# Patient Record
Sex: MALE | Race: OTHER | HISPANIC OR LATINO | URBAN - METROPOLITAN AREA
[De-identification: names, ages, dates, MRNs, and addresses within clinical notes are randomized per-mention and may not be internally consistent; named-entity substitution may affect disease eponyms.]

---

## 2022-06-06 VITALS
RESPIRATION RATE: 16 BRPM | DIASTOLIC BLOOD PRESSURE: 103 MMHG | SYSTOLIC BLOOD PRESSURE: 213 MMHG | OXYGEN SATURATION: 98 % | WEIGHT: 154.98 LBS | HEART RATE: 73 BPM | TEMPERATURE: 98 F

## 2022-06-06 RX ORDER — CHLORHEXIDINE GLUCONATE 213 G/1000ML
1 SOLUTION TOPICAL ONCE
Refills: 0 | Status: DISCONTINUED | OUTPATIENT
Start: 2022-06-16 | End: 2022-06-30

## 2022-06-06 NOTE — H&P ADULT - HISTORY OF PRESENT ILLNESS
Cardiologist: Dr. Michelle   Pharmacy:   COVID: 6/6 or 6/7 from [address] [place] [address]    Escort:     68yo M with a FHx of CAD (sister, CABG (40s) and a PMHx moderate aortic stenosis, prostate cancer s/p radical prostatectomy, DM, HTN, HLD, PVD, BPH presented to Dr. Michelle c/o FERRER on walking 2-3 blocks with retrosternal tightness and easy fatigue with mild exertion x _____. Admits to occasional palpitations and dizziness. Denies _____ chest pain, orthopnea, leg pains/edema, LOC. Exercise Stress Echo (5/27/22): Good exercise tolerance without chest pain. 2.0mm ST depression at peak exercise. Normal LV size with mild concentric hypertrophy; LVEF 60%. Mild diastolic dysfunction with an impaired relaxation pattern. Moderate aortic stenosis. In light of pt's risk factors, abnormal Echocardiogram, and CCS Angina Class ______, pt is referred to [facility] for cardiac catheterization with possible intervention if clinically indicated.      Cardiologist: Dr. Michelle   Pharmacy: [facility] [id number] [place], [place] (meds verified with pharmacy)  COVID: 6/6 or 6/7 from [address]    Escort: Friend    66yo M with a FHx of CAD (sister, CABG (40s) and a PMHx moderate aortic stenosis, prostate cancer s/p radical prostatectomy, DM, HTN, HLD, PVD, BPH presented to Dr. Michelle c/o FERRER on walking 2-3 blocks with retrosternal tightness and easy fatigue with mild exertion. Admits to occasional palpitations and dizziness. Denies chest pain, orthopnea, leg pains/edema, LOC. Exercise Stress Echo (5/27/22): Good exercise tolerance without chest pain. 2.0mm ST depression at peak exercise. Normal LV size with mild concentric hypertrophy; LVEF 60%. Mild diastolic dysfunction with an impaired relaxation pattern. Moderate aortic stenosis. In light of pt's risk factors, abnormal Echocardiogram, and CCS Angina Class III, pt is referred to [facility] for cardiac catheterization with possible intervention if clinically indicated.      Cardiologist: Dr. Michelle   Pharmacy: [facility] [id number] [place], [place] (meds verified with pharmacy)  COVID: 6/6 or 6/7 from [address]    Escort: Friend    68yo Marshallese speaking M with a FHx of CAD (sister, CABG (40s) and a PMHx moderate aortic stenosis, prostate cancer s/p radical prostatectomy, DM, HTN, HLD, PVD, BPH presented to Dr. Michelle c/o FERRER on walking 2-3 blocks with retrosternal tightness and easy fatigue with mild exertion. Admits to occasional palpitations and dizziness. Denies chest pain, orthopnea, leg pains/edema, LOC. Exercise Stress Echo (5/27/22): Good exercise tolerance without chest pain. 2.0mm ST depression at peak exercise. Normal LV size with mild concentric hypertrophy; LVEF 60%. Mild diastolic dysfunction with an impaired relaxation pattern. Moderate aortic stenosis. In light of pt's risk factors, abnormal Echocardiogram, and CCS Angina Class III, pt is referred to [facility] for cardiac catheterization with possible intervention if clinically indicated.

## 2022-06-06 NOTE — H&P ADULT - NSHPLABSRESULTS_GEN_ALL_CORE
14.0   6.77  )-----------( 285      ( 16 Jun 2022 09:20 )             44.2       06-16    135  |  95<L>  |  13  ----------------------------<  312<H>  4.2   |  29  |  1.01    Ca    10.1      16 Jun 2022 09:47    TPro  8.8<H>  /  Alb  4.6  /  TBili  0.3  /  DBili  x   /  AST  26  /  ALT  21  /  AlkPhos  101  06-16      PT/INR - ( 16 Jun 2022 09:20 )   PT: 11.7 sec;   INR: 0.98          PTT - ( 16 Jun 2022 09:20 )  PTT:30.9 sec    CARDIAC MARKERS ( 16 Jun 2022 09:47 )  x     / x     / 289 U/L / x     / 2.8 ng/mL

## 2022-06-06 NOTE — H&P ADULT - NEUROLOGICAL
strength equal in all fields 5/5/normal/cranial nerves II-XII intact/sensation intact/responds to verbal commands

## 2022-06-06 NOTE — H&P ADULT - NSICDXPASTMEDICALHX_GEN_ALL_CORE_FT
PAST MEDICAL HISTORY:  Aortic stenosis     Diabetes     History of BPH     Hyperlipidemia     Hypertension     Prostate cancer     PVD (peripheral vascular disease)

## 2022-06-06 NOTE — H&P ADULT - NSICDXFAMILYHX_GEN_ALL_CORE_FT
Pt is asking for letter to his employer    \"my work is disputing the ED visit Aug 10th  2017  and that it was not necessary\" per Bill  \" would Sarah send a letter/ note to my company that d/t working in heat in [place] house, that I was dehyrdated.   My company is telling me it was not a work related ED visit\" per Bill   \" I needed IV's and the clinic could not help with this so I had to go to ED\" per pt    He works for [employer]   The [address] [employer]  [address]    Pt is going to call back with fax number   FAMILY HISTORY:  Father  Still living? Unknown  FH: diabetes mellitus, Age at diagnosis: Age Unknown  FH: hypertension, Age at diagnosis: Age Unknown    Mother  Still living? Unknown  FH: hypertension, Age at diagnosis: Age Unknown    Sibling  Still living? Unknown  FH: CABG (coronary artery bypass surgery), Age at diagnosis: Age Unknown

## 2022-06-06 NOTE — H&P ADULT - ASSESSMENT
66yo M with a FHx of CAD (sister, CABG (40s) and a PMHx moderate aortic stenosis, prostate cancer s/p radical prostatectomy, DM, HTN, HLD, PVD, BPH presented to Dr. Michelle c/o CARISSA on walking 2-3 blocks with retrosternal tightness and easy fatigue with mild exertion. In light of pt's risk factors, abnormal Echocardiogram, and CCS Angina Class III, pt is referred to [facility] for cardiac catheterization with possible intervention if clinically indicated.    EKG: 			  ASA __			Mallampati class: __         Anginal Class: __    -H/H = 14.0/44.2  . Pt denies BRBPR, hematuria, hematochezia, melena. Pt reports good compliance w/ home ________, stating his last dose of ____ was ____. Pt loaded w/ ASA ___mg x1 and Plavix ___mg x1  -BUN/Cr = . EF ___. Euvolemic on exam. IV NS @ ___cc/hr x __hrs started pre procedure    Sedation Plan:   Moderate  Patient Is Suitable Candidate For Sedation?     Yes    Risks & benefits of procedure and alternative therapy have been explained to the patient including but not limited to: allergic reaction, bleeding with possible need for blood transfusion, infection, renal and vascular compromise, limb damage, arrhythmia, stroke, vessel dissection/perforation, myocardial infarction, and emergent CABG. Informed consent obtained at bedside and included in chart. 68yo M with a FHx of CAD (sister, CABG (40s) and a PMHx moderate aortic stenosis, prostate cancer s/p radical prostatectomy, DM, HTN, HLD, PVD, BPH presented to Dr. Michelle c/o CARISSA on walking 2-3 blocks with retrosternal tightness and easy fatigue with mild exertion. In light of pt's risk factors, abnormal Echocardiogram, and CCS Angina Class III, pt is referred to [facility] for cardiac catheterization with possible intervention if clinically indicated.    EKG: NSR 86bpm			  ASA III			Mallampati class: III           Anginal Class: III    -H/H = 14.0/44.2  . Pt denies BRBPR, hematuria, hematochezia, melena. Pt reports good compliance w/ home ASA, stating his last dose of ASA was 6/15/22. Pt loaded w/ ASA 81mg x1 and Plavix 600mg x1  -BUN/Cr = 13/1.01. EF 60%. Euvolemic on exam. IV NS @250cc bolus followed by 75cc/2hrs started pre procedure    -Of note patient /103 when examined. Given hydralazine 10mg IV push once and administered home med amlodipine 10mg PO once. REPEAT BP:    Sedation Plan:   Moderate  Patient Is Suitable Candidate For Sedation?     Yes    Risks & benefits of procedure and alternative therapy have been explained to the patient including but not limited to: allergic reaction, bleeding with possible need for blood transfusion, infection, renal and vascular compromise, limb damage, arrhythmia, stroke, vessel dissection/perforation, myocardial infarction, and emergent CABG. Informed consent obtained at bedside and included in chart. 68yo Nepali speaking M with a FHx of CAD (sister, CABG (40s) and a PMHx moderate aortic stenosis, prostate cancer s/p radical prostatectomy, DM, HTN, HLD, PVD, BPH presented to Dr. Michelle c/o CARISSA on walking 2-3 blocks with retrosternal tightness and easy fatigue with mild exertion. In light of pt's risk factors, abnormal Echocardiogram, and CCS Angina Class III, pt is referred to [facility] for cardiac catheterization with possible intervention if clinically indicated.    EKG: NSR 86bpm, no ST/T wave inversions			  ASA III			Mallampati class: III           Anginal Class: III    -H/H = 14.0/44.2  . Pt denies BRBPR, hematuria, hematochezia, melena. Pt reports good compliance w/ home ASA, stating his last dose of ASA was 6/15/22. Pt loaded w/ ASA 81mg x1 and Plavix 600mg x1  -BUN/Cr = 13/1.01. EF 60%. Euvolemic on exam. IV NS @250cc bolus followed by 75cc/2hrs started pre procedure    -Of note patient /103 when examined. Given hydralazine 10mg IV once and administered home med amlodipine 10mg PO once. Repeat BP: 182/88    Sedation Plan:   Moderate  Patient Is Suitable Candidate For Sedation?     Yes    Risks & benefits of procedure and alternative therapy have been explained to the patient including but not limited to: allergic reaction, bleeding with possible need for blood transfusion, infection, renal and vascular compromise, limb damage, arrhythmia, stroke, vessel dissection/perforation, myocardial infarction, and emergent CABG. Informed consent obtained at bedside and included in chart.

## 2022-06-16 ENCOUNTER — OUTPATIENT (OUTPATIENT)
Dept: OUTPATIENT SERVICES | Facility: HOSPITAL | Age: 67
LOS: 1 days | Discharge: HOME | End: 2022-06-16
Payer: MEDICAID

## 2022-06-16 DIAGNOSIS — Z90.79 ACQUIRED ABSENCE OF OTHER GENITAL ORGAN(S): Chronic | ICD-10-CM

## 2022-06-16 LAB
A1C WITH ESTIMATED AVERAGE GLUCOSE RESULT: 11.3 % — HIGH (ref 4–5.6)
ALBUMIN SERPL ELPH-MCNC: 4.6 G/DL — SIGNIFICANT CHANGE UP (ref 3.3–5)
ALP SERPL-CCNC: 101 U/L — SIGNIFICANT CHANGE UP (ref 40–120)
ALT FLD-CCNC: 21 U/L — SIGNIFICANT CHANGE UP (ref 10–45)
ANION GAP SERPL CALC-SCNC: 11 MMOL/L — SIGNIFICANT CHANGE UP (ref 5–17)
ANISOCYTOSIS BLD QL: SLIGHT — SIGNIFICANT CHANGE UP
APTT BLD: 30.9 SEC — SIGNIFICANT CHANGE UP (ref 27.5–35.5)
AST SERPL-CCNC: 26 U/L — SIGNIFICANT CHANGE UP (ref 10–40)
BASOPHILS # BLD AUTO: 0 K/UL — SIGNIFICANT CHANGE UP (ref 0–0.2)
BASOPHILS NFR BLD AUTO: 0 % — SIGNIFICANT CHANGE UP (ref 0–2)
BILIRUB SERPL-MCNC: 0.3 MG/DL — SIGNIFICANT CHANGE UP (ref 0.2–1.2)
BUN SERPL-MCNC: 13 MG/DL — SIGNIFICANT CHANGE UP (ref 7–23)
CALCIUM SERPL-MCNC: 10.1 MG/DL — SIGNIFICANT CHANGE UP (ref 8.4–10.5)
CHLORIDE SERPL-SCNC: 95 MMOL/L — LOW (ref 96–108)
CHOLEST SERPL-MCNC: 226 MG/DL — HIGH
CK MB CFR SERPL CALC: 2.8 NG/ML — SIGNIFICANT CHANGE UP (ref 0–6.7)
CK SERPL-CCNC: 289 U/L — HIGH (ref 30–200)
CO2 SERPL-SCNC: 29 MMOL/L — SIGNIFICANT CHANGE UP (ref 22–31)
CREAT SERPL-MCNC: 1.01 MG/DL — SIGNIFICANT CHANGE UP (ref 0.5–1.3)
CRP SERPL-MCNC: <3 MG/L — SIGNIFICANT CHANGE UP (ref 0–4)
EGFR: 82 ML/MIN/1.73M2 — SIGNIFICANT CHANGE UP
EOSINOPHIL # BLD AUTO: 1.13 K/UL — HIGH (ref 0–0.5)
EOSINOPHIL NFR BLD AUTO: 16.7 % — HIGH (ref 0–6)
ESTIMATED AVERAGE GLUCOSE: 278 MG/DL — HIGH (ref 68–114)
GIANT PLATELETS BLD QL SMEAR: PRESENT — SIGNIFICANT CHANGE UP
GLUCOSE BLDC GLUCOMTR-MCNC: 190 MG/DL — HIGH (ref 70–99)
GLUCOSE SERPL-MCNC: 312 MG/DL — HIGH (ref 70–99)
HCT VFR BLD CALC: 44.2 % — SIGNIFICANT CHANGE UP (ref 39–50)
HDLC SERPL-MCNC: 78 MG/DL — SIGNIFICANT CHANGE UP
HGB BLD-MCNC: 14 G/DL — SIGNIFICANT CHANGE UP (ref 13–17)
INR BLD: 0.98 — SIGNIFICANT CHANGE UP (ref 0.88–1.16)
LIPID PNL WITH DIRECT LDL SERPL: 131 MG/DL — HIGH
LYMPHOCYTES # BLD AUTO: 0.89 K/UL — LOW (ref 1–3.3)
LYMPHOCYTES # BLD AUTO: 13.1 % — SIGNIFICANT CHANGE UP (ref 13–44)
MACROCYTES BLD QL: SLIGHT — SIGNIFICANT CHANGE UP
MANUAL SMEAR VERIFICATION: SIGNIFICANT CHANGE UP
MCHC RBC-ENTMCNC: 25.7 PG — LOW (ref 27–34)
MCHC RBC-ENTMCNC: 31.7 GM/DL — LOW (ref 32–36)
MCV RBC AUTO: 81.1 FL — SIGNIFICANT CHANGE UP (ref 80–100)
MICROCYTES BLD QL: SLIGHT — SIGNIFICANT CHANGE UP
MONOCYTES # BLD AUTO: 0.53 K/UL — SIGNIFICANT CHANGE UP (ref 0–0.9)
MONOCYTES NFR BLD AUTO: 7.9 % — SIGNIFICANT CHANGE UP (ref 2–14)
NEUTROPHILS # BLD AUTO: 4.16 K/UL — SIGNIFICANT CHANGE UP (ref 1.8–7.4)
NEUTROPHILS NFR BLD AUTO: 61.4 % — SIGNIFICANT CHANGE UP (ref 43–77)
NON HDL CHOLESTEROL: 148 MG/DL — HIGH
OVALOCYTES BLD QL SMEAR: SLIGHT — SIGNIFICANT CHANGE UP
PLAT MORPH BLD: ABNORMAL
PLATELET # BLD AUTO: 285 K/UL — SIGNIFICANT CHANGE UP (ref 150–400)
POTASSIUM SERPL-MCNC: 4.2 MMOL/L — SIGNIFICANT CHANGE UP (ref 3.5–5.3)
POTASSIUM SERPL-SCNC: 4.2 MMOL/L — SIGNIFICANT CHANGE UP (ref 3.5–5.3)
PROT SERPL-MCNC: 8.8 G/DL — HIGH (ref 6–8.3)
PROTHROM AB SERPL-ACNC: 11.7 SEC — SIGNIFICANT CHANGE UP (ref 10.5–13.4)
RBC # BLD: 5.45 M/UL — SIGNIFICANT CHANGE UP (ref 4.2–5.8)
RBC # FLD: 17.1 % — HIGH (ref 10.3–14.5)
RBC BLD AUTO: ABNORMAL
SMUDGE CELLS # BLD: PRESENT — SIGNIFICANT CHANGE UP
SODIUM SERPL-SCNC: 135 MMOL/L — SIGNIFICANT CHANGE UP (ref 135–145)
TRIGL SERPL-MCNC: 84 MG/DL — SIGNIFICANT CHANGE UP
VARIANT LYMPHS # BLD: 0.9 % — SIGNIFICANT CHANGE UP (ref 0–6)
WBC # BLD: 6.77 K/UL — SIGNIFICANT CHANGE UP (ref 3.8–10.5)
WBC # FLD AUTO: 6.77 K/UL — SIGNIFICANT CHANGE UP (ref 3.8–10.5)

## 2022-06-16 PROCEDURE — 93005 ELECTROCARDIOGRAM TRACING: CPT

## 2022-06-16 PROCEDURE — 99152 MOD SED SAME PHYS/QHP 5/>YRS: CPT

## 2022-06-16 PROCEDURE — 93460 R&L HRT ART/VENTRICLE ANGIO: CPT | Mod: 26

## 2022-06-16 PROCEDURE — 93010 ELECTROCARDIOGRAM REPORT: CPT

## 2022-06-16 PROCEDURE — C1887: CPT

## 2022-06-16 PROCEDURE — 80053 COMPREHEN METABOLIC PANEL: CPT

## 2022-06-16 PROCEDURE — 99205 OFFICE O/P NEW HI 60 MIN: CPT

## 2022-06-16 PROCEDURE — 85610 PROTHROMBIN TIME: CPT

## 2022-06-16 PROCEDURE — 93460 R&L HRT ART/VENTRICLE ANGIO: CPT

## 2022-06-16 PROCEDURE — 85025 COMPLETE CBC W/AUTO DIFF WBC: CPT

## 2022-06-16 PROCEDURE — 99153 MOD SED SAME PHYS/QHP EA: CPT

## 2022-06-16 PROCEDURE — 85730 THROMBOPLASTIN TIME PARTIAL: CPT

## 2022-06-16 PROCEDURE — 82962 GLUCOSE BLOOD TEST: CPT

## 2022-06-16 PROCEDURE — C1760: CPT

## 2022-06-16 PROCEDURE — 80061 LIPID PANEL: CPT

## 2022-06-16 PROCEDURE — 82550 ASSAY OF CK (CPK): CPT

## 2022-06-16 PROCEDURE — 82553 CREATINE MB FRACTION: CPT

## 2022-06-16 PROCEDURE — C1894: CPT

## 2022-06-16 PROCEDURE — 86140 C-REACTIVE PROTEIN: CPT

## 2022-06-16 PROCEDURE — C1769: CPT

## 2022-06-16 PROCEDURE — 36415 COLL VENOUS BLD VENIPUNCTURE: CPT

## 2022-06-16 PROCEDURE — 83036 HEMOGLOBIN GLYCOSYLATED A1C: CPT

## 2022-06-16 RX ORDER — INSULIN GLARGINE 100 [IU]/ML
55 INJECTION, SOLUTION SUBCUTANEOUS
Qty: 0 | Refills: 0 | DISCHARGE

## 2022-06-16 RX ORDER — CLOPIDOGREL BISULFATE 75 MG/1
600 TABLET, FILM COATED ORAL ONCE
Refills: 0 | Status: COMPLETED | OUTPATIENT
Start: 2022-06-16 | End: 2022-06-16

## 2022-06-16 RX ORDER — METFORMIN HYDROCHLORIDE 850 MG/1
1 TABLET ORAL
Qty: 0 | Refills: 0 | DISCHARGE

## 2022-06-16 RX ORDER — DEXTROSE 50 % IN WATER 50 %
25 SYRINGE (ML) INTRAVENOUS ONCE
Refills: 0 | Status: DISCONTINUED | OUTPATIENT
Start: 2022-06-16 | End: 2022-06-30

## 2022-06-16 RX ORDER — METOPROLOL TARTRATE 50 MG
1 TABLET ORAL
Qty: 0 | Refills: 0 | DISCHARGE

## 2022-06-16 RX ORDER — SODIUM CHLORIDE 9 MG/ML
250 INJECTION INTRAMUSCULAR; INTRAVENOUS; SUBCUTANEOUS ONCE
Refills: 0 | Status: COMPLETED | OUTPATIENT
Start: 2022-06-16 | End: 2022-06-16

## 2022-06-16 RX ORDER — SODIUM CHLORIDE 9 MG/ML
1000 INJECTION, SOLUTION INTRAVENOUS
Refills: 0 | Status: DISCONTINUED | OUTPATIENT
Start: 2022-06-16 | End: 2022-06-30

## 2022-06-16 RX ORDER — DEXTROSE 50 % IN WATER 50 %
15 SYRINGE (ML) INTRAVENOUS ONCE
Refills: 0 | Status: DISCONTINUED | OUTPATIENT
Start: 2022-06-16 | End: 2022-06-30

## 2022-06-16 RX ORDER — AMLODIPINE BESYLATE 2.5 MG/1
1 TABLET ORAL
Qty: 0 | Refills: 0 | DISCHARGE

## 2022-06-16 RX ORDER — LISINOPRIL 2.5 MG/1
1 TABLET ORAL
Qty: 0 | Refills: 0 | DISCHARGE

## 2022-06-16 RX ORDER — ATORVASTATIN CALCIUM 80 MG/1
1 TABLET, FILM COATED ORAL
Qty: 0 | Refills: 0 | DISCHARGE

## 2022-06-16 RX ORDER — ASPIRIN/CALCIUM CARB/MAGNESIUM 324 MG
81 TABLET ORAL ONCE
Refills: 0 | Status: COMPLETED | OUTPATIENT
Start: 2022-06-16 | End: 2022-06-16

## 2022-06-16 RX ORDER — DEXTROSE 50 % IN WATER 50 %
12.5 SYRINGE (ML) INTRAVENOUS ONCE
Refills: 0 | Status: DISCONTINUED | OUTPATIENT
Start: 2022-06-16 | End: 2022-06-30

## 2022-06-16 RX ORDER — ASPIRIN/CALCIUM CARB/MAGNESIUM 324 MG
1 TABLET ORAL
Qty: 0 | Refills: 0 | DISCHARGE

## 2022-06-16 RX ORDER — INSULIN LISPRO 100/ML
VIAL (ML) SUBCUTANEOUS ONCE
Refills: 0 | Status: COMPLETED | OUTPATIENT
Start: 2022-06-16 | End: 2022-06-16

## 2022-06-16 RX ORDER — SODIUM CHLORIDE 9 MG/ML
500 INJECTION INTRAMUSCULAR; INTRAVENOUS; SUBCUTANEOUS
Refills: 0 | Status: DISCONTINUED | OUTPATIENT
Start: 2022-06-16 | End: 2022-06-16

## 2022-06-16 RX ORDER — GLUCAGON INJECTION, SOLUTION 0.5 MG/.1ML
1 INJECTION, SOLUTION SUBCUTANEOUS ONCE
Refills: 0 | Status: DISCONTINUED | OUTPATIENT
Start: 2022-06-16 | End: 2022-06-30

## 2022-06-16 RX ORDER — AMLODIPINE BESYLATE 2.5 MG/1
10 TABLET ORAL ONCE
Refills: 0 | Status: COMPLETED | OUTPATIENT
Start: 2022-06-16 | End: 2022-06-16

## 2022-06-16 RX ORDER — HYDRALAZINE HCL 50 MG
10 TABLET ORAL ONCE
Refills: 0 | Status: COMPLETED | OUTPATIENT
Start: 2022-06-16 | End: 2022-06-16

## 2022-06-16 RX ORDER — INSULIN DETEMIR 100/ML (3)
0 INSULIN PEN (ML) SUBCUTANEOUS
Qty: 0 | Refills: 0 | DISCHARGE

## 2022-06-16 RX ADMIN — CLOPIDOGREL BISULFATE 600 MILLIGRAM(S): 75 TABLET, FILM COATED ORAL at 11:01

## 2022-06-16 RX ADMIN — AMLODIPINE BESYLATE 10 MILLIGRAM(S): 2.5 TABLET ORAL at 10:37

## 2022-06-16 RX ADMIN — Medication 8: at 10:58

## 2022-06-16 RX ADMIN — Medication 10 MILLIGRAM(S): at 10:01

## 2022-06-16 RX ADMIN — SODIUM CHLORIDE 75 MILLILITER(S): 9 INJECTION INTRAMUSCULAR; INTRAVENOUS; SUBCUTANEOUS at 10:42

## 2022-06-16 RX ADMIN — Medication 81 MILLIGRAM(S): at 11:01

## 2022-06-16 RX ADMIN — SODIUM CHLORIDE 1000 MILLILITER(S): 9 INJECTION INTRAMUSCULAR; INTRAVENOUS; SUBCUTANEOUS at 10:41

## 2022-06-16 NOTE — CONSULT NOTE ADULT - NS ATTEND AMEND GEN_ALL_CORE FT
Patient seen and examined with PA/fellow bedside and discussed during rounds.  PA/fellow note read, including vitals, physical findings, laboratory data, and radiological reports.   Revisions included below. Direct personal management at bedside and extensive interpretation of the data performed.  Plan was outlined and discussed in details with the multidisciplinary team.  Decision making of high complexity.     66y/o Syriac speaking male with a FHx of CAD (sister, CABG (40s) and a PMHx moderate aortic stenosis, prostate cancer s/p radical prostatectomy, DM, HTN, HLD, PVD, BPH presented with FERRER on walking 2-3 blocks with retrosternal tightness and easy fatigue with mild exertion. Admits to occasional palpitations and dizziness. Denies chest pain, orthopnea, leg pains/edema, LOC. Exercise Stress Echo (5/27/22) showed 2.0mm ST depression at peak exercise, normal LV size with mild concentric hypertrophy, LVEF 60%, moderate aortic stenosis. Patient undwent cardiac cath showing LV/Ao peak-peak gradient 40mmHg on pullback, nonobstructive CAD. Patient is low risk for isolated SAVR.   -obtain TTE performed by Dr. Michelle  -plan for TAVR testing, CT surgical evaluation  -F/u in [facility] office as outpt    I was physically present for the key portions of the evaluation and management (E/M) service provided.  I agree with the above history, physical, and plan which I have reviewed and edited where appropriate.     80 minutes spent on total encounter; more than 50% of the visit was spent counseling and/or coordinating care by the attending physician.

## 2022-06-16 NOTE — PROGRESS NOTE ADULT - SUBJECTIVE AND OBJECTIVE BOX
Interventional Cardiology PA [facility] Discharge Note    s/p R/C cath 6/16: non obstructive coronaries, RHC ladi, severe AS SALAS 0.9 cm2 and P-P gradient 39mmHg  RRA and RFV access ( [facility]).  No IVF post as confirmed by interventional fellow    Patient without complaints. Ambulated and voided without difficulties    Afebrile, VSS    Ext:    		Right  Groin:  no hmatoma, no bruit, dressing; C/D/I  		Right Radial : no hematoma,  no bleeding, dressing; C/D/I      Pulses:    intact RAD 2+  R DP and PT 2+     A/P:    66yo Setswana speaking M with a FHx of CAD (sister, CABG (40s) and a PMHx moderate aortic stenosis, prostate cancer s/p radical prostatectomy, DM, HTN, HLD, PVD, BPH presented to Dr. Michelle c/o FERRER on walking 2-3 blocks with retrosternal tightness and easy fatigue with mild exertion. In light of pt's risk factors, abnormal Echocardiogram, and CCS Angina Class III, pt is referred to [facility] for cardiac catheterization. R/LHC cath today revealed non obstructive coronaries, RHC ladi, severe AS SALAS 0.9 cm2 and P-P gradient 39mmHg.  ECHO to be done today before discharge and CTS consult as outpt.                1.	Stable for discharge as per attending Dr. Michelle  after bed rest, pt voids, groin/wrist stable and 30 minutes of ambulation.  2.	Follow-up with Cardiologist _Dr Michelle  in 1-2 weeks, CTS to be consulted as outpatient as per Dr Michelle  3.	Discharged forms signed and copies in chart

## 2022-06-16 NOTE — CONSULT NOTE ADULT - ASSESSMENT
Assesment:  66y Male  68y/o Turkmen speaking male with a FHx of CAD (sister, CABG (40s) and a PMHx moderate aortic stenosis, prostate cancer s/p radical prostatectomy, DM, HTN, HLD, PVD, BPH presented to Dr. Michelle c/o FERRER on walking 2-3 blocks with retrosternal tightness and easy fatigue with mild exertion. Admits to occasional palpitations and dizziness. Denies chest pain, orthopnea, leg pains/edema, LOC. Exercise Stress Echo (5/27/22): Good exercise tolerance without chest pain. 2.0mm ST depression at peak exercise. Normal LV size with mild concentric hypertrophy; LVEF 60%. Mild diastolic dysfunction with an impaired relaxation pattern. Moderate aortic stenosis. In light of pt's risk factors, abnormal Echocardiogram, and CCS Angina Class III, pt is referred to [facility] for cardiac catheterization with possible intervention if clinically indicated. Cath today showed-->      Plan:  Problem 1: Aortic Stenosis:      Problem 2:      Problem 3:      Problem 4:    I have reviewed clinical labs tests and reports, radiology tests and reports, as well as old patient medical records, and discussed with the refering physician.     Assesment:  66y Male  66y/o Irish speaking male with a FHx of CAD (sister, CABG (40s) and a PMHx moderate aortic stenosis, prostate cancer s/p radical prostatectomy, DM, HTN, HLD, PVD, BPH presented to Dr. Michelle c/o FERRER on walking 2-3 blocks with retrosternal tightness and easy fatigue with mild exertion. Admits to occasional palpitations and dizziness. Denies chest pain, orthopnea, leg pains/edema, LOC. Exercise Stress Echo (5/27/22): Good exercise tolerance without chest pain. 2.0mm ST depression at peak exercise. Normal LV size with mild concentric hypertrophy; LVEF 60%. Mild diastolic dysfunction with an impaired relaxation pattern. Moderate aortic stenosis. In light of pt's risk factors, abnormal Echocardiogram, and CCS Angina Class III, pt is referred to [facility] for cardiac catheterization with possible intervention if clinically indicated. Cath today showed-->      Plan:  Problem 1: Aortic Stenosis: To follow up with Dr. Hoang in the office.  Gave him a card for Dr. Hoang.  He can call [phone] to schedule his follow up visit, in 2-3 weeks.  Remaining instructions per primary team.     I have reviewed clinical labs tests and reports, radiology tests and reports, as well as old patient medical records, and discussed with the refering physician.

## 2022-06-16 NOTE — CONSULT NOTE ADULT - SUBJECTIVE AND OBJECTIVE BOX
Surgeon: Arun Hoang    Requesting Physician: Dr. Michelle    HISTORY OF PRESENT ILLNESS (Need 4):  From H&P--> 66y/o [place] speaking male with a FHx of CAD (sister, CABG (40s) and a PMHx moderate aortic stenosis, prostate cancer s/p radical prostatectomy, DM, HTN, HLD, PVD, BPH presented to Dr. Michelle c/o FERRER on walking 2-3 blocks with retrosternal tightness and easy fatigue with mild exertion. Admits to occasional palpitations and dizziness. Denies chest pain, orthopnea, leg pains/edema, LOC. Exercise Stress Echo (5/27/22): Good exercise tolerance without chest pain. 2.0mm ST depression at peak exercise. Normal LV size with mild concentric hypertrophy; LVEF 60%. Mild diastolic dysfunction with an impaired relaxation pattern. Moderate aortic stenosis. In light of pt's risk factors, abnormal Echocardiogram, and CCS Angina Class III, pt is referred to [facility] for cardiac catheterization with possible intervention if clinically indicated.     Current HPI--> Patient..    PAST MEDICAL & SURGICAL HISTORY:  Aortic stenosis  Hypertension  Hyperlipidemia  Diabetes  PVD (peripheral vascular disease)  History of BPH  Prostate cancer  H/O prostatectomy  2018        MEDICATIONS  (STANDING):  chlorhexidine 4% Liquid 1 Application(s) Topical once  dextrose 5%. 1000 milliLiter(s) (100 mL/Hr) IV Continuous <Continuous>  dextrose 5%. 1000 milliLiter(s) (50 mL/Hr) IV Continuous <Continuous>  dextrose 50% Injectable 25 Gram(s) IV Push Once  dextrose 50% Injectable 12.5 Gram(s) IV Push Once  dextrose 50% Injectable 25 Gram(s) IV Push Once  glucagon  Injectable 1 milliGRAM(s) IntraMuscular Once    MEDICATIONS  (PRN):  dextrose Oral Gel 15 Gram(s) Oral Once PRN Blood Glucose LESS THAN 70 milliGRAM(s)/deciliter      Allergies    No Known Allergies    Intolerances      SOCIAL HISTORY:  Social Hx: Admits to social alcohol use. Denies tobacco use. Unknown illicit drug history.    FAMILY HISTORY:  FH: CABG (coronary artery bypass surgery) (Sibling)  Sister in 40s    FH: diabetes mellitus (Father)    FH: hypertension (Father, Mother)        Review of Systems (Need 10):  CONSTITUTIONAL: Denies fevers / chills, sweats, fatigue, weight loss, weight gain                                       NEURO:  Denies parathesias, seizures, syncope, confusion                                                                                  EYES:  Denies blurry vision, discharge, pain, loss of vision                                                                                    ENMT:  Denies difficulty hearing, vertigo, dysphagia, epistaxis, recent dental work                                       CV:  +CP. +FERRER.  Denies palpitations, orthopnea                                                                                           RESPIRATORY:  +SOB.  Denies wWheezing, cough / sputum, hemoptysis                                                               GI:  Denies nausea, vomiting, diarrhea, constipation, melena                                                                          : Denies hematuria, dysuria, urgency, incontinence                                                                                          MUSKULOSKELETAL:  Denies arthritis, joint swelling, muscle weakness                                                             SKIN/BREAST:  Denies rash, itching, hair loss, masses                                                                                              PSYCH:  Denies depression, anxiety, suicidal ideation                                                                                                HEME/LYMPH:  Denies bruises easily, enlarged lymph nodes, tender lymph nodes                                          ENDOCRINE:  Denies cold intolerance, heat intolerance, polydipsia                                                                      Vital Signs Last 24 Hrs  T(C): --  T(F): --  HR: --  BP: --  BP(mean): --  RR: --  SpO2: --    Physical Exam (Need 8)  GEN: NAD, looks comfortable  Psych: Mood appropriate  Neuro: A&Ox3.  No focal deficits.  Moving all extremities.   HEENT: No obvious abnormalities  CV: S1S2, regular, no murmurs appreciated.  No carotid bruits.  No JVD  Lungs: Clear B/L.  No wheezing, rales or rhonchi  ABD: Soft, non-tender, non-distended.  +Bowel sounds  EXT: Warm and well perfused.  No peripheral edema noted  Musculoskeletal: Moving all extremities with normal ROM, no joint swelling  PV: Pedal pulses palpable    LABS:                        14.0   6.77  )-----------( 285      ( 16 Jun 2022 09:20 )             44.2     06-16    135  |  95<L>  |  13  ----------------------------<  312<H>  4.2   |  29  |  1.01    Ca    10.1      16 Jun 2022 09:47    TPro  8.8<H>  /  Alb  4.6  /  TBili  0.3  /  DBili  x   /  AST  26  /  ALT  21  /  AlkPhos  101  06-16    PT/INR - ( 16 Jun 2022 09:20 )   PT: 11.7 sec;   INR: 0.98          PTT - ( 16 Jun 2022 09:20 )  PTT:30.9 sec    CARDIAC MARKERS ( 16 Jun 2022 09:47 )  x     / x     / 289 U/L / x     / 2.8 ng/mL      RADIOLOGY & ADDITIONAL STUDIES:    CXR:     CT Scan:     EKG:     TTE:    Cardiac Cath today: Surgeon: Arun Hoang    Requesting Physician: Dr. Michelle    HISTORY OF PRESENT ILLNESS (Need 4):  From H&P--> 68y/o Persian speaking male with a FHx of CAD (sister, CABG (40s) and a PMHx moderate aortic stenosis, prostate cancer s/p radical prostatectomy, DM, HTN, HLD, PVD, BPH presented to Dr. Michelle c/o FERRER on walking 2-3 blocks with retrosternal tightness and easy fatigue with mild exertion. Admits to occasional palpitations and dizziness. Denies chest pain, orthopnea, leg pains/edema, LOC. Exercise Stress Echo (5/27/22): Good exercise tolerance without chest pain. 2.0mm ST depression at peak exercise. Normal LV size with mild concentric hypertrophy; LVEF 60%. Mild diastolic dysfunction with an impaired relaxation pattern. Moderate aortic stenosis. In light of pt's risk factors, abnormal Echocardiogram, and CCS Angina Class III, pt is referred to [facility] for cardiac catheterization with possible intervention if clinically indicated.     Current HPI--> Via family member who is present, speaks English.  Patient feels well currently, does admit to FERRER with 2-3 blocks and associated CP and fatigue.  Here for elective cath, and found to have moderate AS (known).  Being discharged home soon, following radial band removal.  Was told he will come back in "a few months" for a procedure on his aortic valve.     PAST MEDICAL & SURGICAL HISTORY:  Aortic stenosis  Hypertension  Hyperlipidemia  Diabetes  PVD (peripheral vascular disease)  History of BPH  Prostate cancer  H/O prostatectomy  2018        MEDICATIONS  (STANDING):  chlorhexidine 4% Liquid 1 Application(s) Topical once  dextrose 5%. 1000 milliLiter(s) (100 mL/Hr) IV Continuous <Continuous>  dextrose 5%. 1000 milliLiter(s) (50 mL/Hr) IV Continuous <Continuous>  dextrose 50% Injectable 25 Gram(s) IV Push Once  dextrose 50% Injectable 12.5 Gram(s) IV Push Once  dextrose 50% Injectable 25 Gram(s) IV Push Once  glucagon  Injectable 1 milliGRAM(s) IntraMuscular Once    MEDICATIONS  (PRN):  dextrose Oral Gel 15 Gram(s) Oral Once PRN Blood Glucose LESS THAN 70 milliGRAM(s)/deciliter      Allergies    No Known Allergies    Intolerances      SOCIAL HISTORY:  Social Hx: Admits to social alcohol use. Denies tobacco use. Unknown illicit drug history.    FAMILY HISTORY:  FH: CABG (coronary artery bypass surgery) (Sibling)  Sister in 40s    FH: diabetes mellitus (Father)    FH: hypertension (Father, Mother)        Review of Systems (Need 10):  CONSTITUTIONAL: Denies fevers / chills, sweats, fatigue, weight loss, weight gain                                       NEURO:  Denies parathesias, seizures, syncope, confusion                                                                                  EYES:  Denies blurry vision, discharge, pain, loss of vision                                                                                    ENMT:  Denies difficulty hearing, vertigo, dysphagia, epistaxis, recent dental work                                       CV:  +CP. +FERRER.  Denies palpitations, orthopnea                                                                                           RESPIRATORY:  +SOB.  Denies wWheezing, cough / sputum, hemoptysis                                                               GI:  Denies nausea, vomiting, diarrhea, constipation, melena                                                                          : Denies hematuria, dysuria, urgency, incontinence                                                                                          MUSKULOSKELETAL:  Denies arthritis, joint swelling, muscle weakness                                                             SKIN/BREAST:  Denies rash, itching, hair loss, masses                                                                                              PSYCH:  Denies depression, anxiety, suicidal ideation                                                                                                HEME/LYMPH:  Denies bruises easily, enlarged lymph nodes, tender lymph nodes                                          ENDOCRINE:  Denies cold intolerance, heat intolerance, polydipsia                                                                      Vital Signs Last 24 Hrs  T(C): --  T(F): --  HR: --  BP: --  BP(mean): --  RR: --  SpO2: --    Physical Exam (Need 8)  GEN: NAD, looks comfortable  Psych: Mood appropriate  Neuro: A&Ox3.  No focal deficits.  Moving all extremities.   HEENT: No obvious abnormalities  CV: S1S2, regular +OLVIN heard throughout precordium, loudest over LSB.  No carotid bruits.  No JVD  Lungs: Clear B/L.  No wheezing, rales or rhonchi  ABD: Soft, non-tender, non-distended.  +Bowel sounds  EXT: Warm and well perfused.  No peripheral edema noted  Musculoskeletal: Moving all extremities with normal ROM, no joint swelling  PV: Pedal pulses palpable  Rt groin site, dressed w/ gauze.  No hematoma, no bleeding seen.  Right radial TR band in place.     LABS:                        14.0   6.77  )-----------( 285      ( 16 Jun 2022 09:20 )             44.2     06-16    135  |  95<L>  |  13  ----------------------------<  312<H>  4.2   |  29  |  1.01    Ca    10.1      16 Jun 2022 09:47    TPro  8.8<H>  /  Alb  4.6  /  TBili  0.3  /  DBili  x   /  AST  26  /  ALT  21  /  AlkPhos  101  06-16    PT/INR - ( 16 Jun 2022 09:20 )   PT: 11.7 sec;   INR: 0.98          PTT - ( 16 Jun 2022 09:20 )  PTT:30.9 sec    CARDIAC MARKERS ( 16 Jun 2022 09:47 )  x     / x     / 289 U/L / x     / 2.8 ng/mL      RADIOLOGY & ADDITIONAL STUDIES:    Cardiac Cath today: s/p R/LHC cath 6/16: non obstructive coronaries, RHC ladi, severe AS SALAS 0.9 cm2 and P-P gradient 39mmHg  RRA and RFV access ( vascade).  No IVF post as confirmed by interventional fellow

## 2022-06-21 PROBLEM — Z00.00 ENCOUNTER FOR PREVENTIVE HEALTH EXAMINATION: Status: ACTIVE | Noted: 2022-06-21

## 2022-06-21 PROBLEM — I10 ESSENTIAL (PRIMARY) HYPERTENSION: Chronic | Status: ACTIVE | Noted: 2022-06-06

## 2022-06-21 PROBLEM — Z87.438 PERSONAL HISTORY OF OTHER DISEASES OF MALE GENITAL ORGANS: Chronic | Status: ACTIVE | Noted: 2022-06-06

## 2022-06-21 PROBLEM — C61 MALIGNANT NEOPLASM OF PROSTATE: Chronic | Status: ACTIVE | Noted: 2022-06-06

## 2022-06-21 PROBLEM — E78.5 HYPERLIPIDEMIA, UNSPECIFIED: Chronic | Status: ACTIVE | Noted: 2022-06-06

## 2022-06-21 PROBLEM — E11.9 TYPE 2 DIABETES MELLITUS WITHOUT COMPLICATIONS: Chronic | Status: ACTIVE | Noted: 2022-06-06

## 2022-06-21 PROBLEM — I35.0 NONRHEUMATIC AORTIC (VALVE) STENOSIS: Chronic | Status: ACTIVE | Noted: 2022-06-06

## 2022-06-21 PROBLEM — I73.9 PERIPHERAL VASCULAR DISEASE, UNSPECIFIED: Chronic | Status: ACTIVE | Noted: 2022-06-06

## 2022-06-24 DIAGNOSIS — I25.110 ATHEROSCLEROTIC HEART DISEASE OF NATIVE CORONARY ARTERY WITH UNSTABLE ANGINA PECTORIS: ICD-10-CM

## 2022-06-24 DIAGNOSIS — R94.39 ABNORMAL RESULT OF OTHER CARDIOVASCULAR FUNCTION STUDY: ICD-10-CM

## 2022-06-24 DIAGNOSIS — I35.0 NONRHEUMATIC AORTIC (VALVE) STENOSIS: ICD-10-CM

## 2022-06-27 ENCOUNTER — APPOINTMENT (OUTPATIENT)
Dept: CARDIOTHORACIC SURGERY | Facility: CLINIC | Age: 67
End: 2022-06-27

## 2022-06-27 VITALS — DIASTOLIC BLOOD PRESSURE: 89 MMHG | SYSTOLIC BLOOD PRESSURE: 187 MMHG

## 2022-06-27 VITALS
HEART RATE: 71 BPM | HEIGHT: 67 IN | TEMPERATURE: 96.6 F | SYSTOLIC BLOOD PRESSURE: 176 MMHG | RESPIRATION RATE: 18 BRPM | BODY MASS INDEX: 23.86 KG/M2 | DIASTOLIC BLOOD PRESSURE: 87 MMHG | WEIGHT: 152 LBS | OXYGEN SATURATION: 97 %

## 2022-06-27 DIAGNOSIS — Z82.49 FAMILY HISTORY OF ISCHEMIC HEART DISEASE AND OTHER DISEASES OF THE CIRCULATORY SYSTEM: ICD-10-CM

## 2022-06-27 DIAGNOSIS — Z86.79 PERSONAL HISTORY OF OTHER DISEASES OF THE CIRCULATORY SYSTEM: ICD-10-CM

## 2022-06-27 DIAGNOSIS — Z86.39 PERSONAL HISTORY OF OTHER ENDOCRINE, NUTRITIONAL AND METABOLIC DISEASE: ICD-10-CM

## 2022-06-27 DIAGNOSIS — Z85.46 PERSONAL HISTORY OF MALIGNANT NEOPLASM OF PROSTATE: ICD-10-CM

## 2022-06-27 DIAGNOSIS — Z78.9 OTHER SPECIFIED HEALTH STATUS: ICD-10-CM

## 2022-06-27 DIAGNOSIS — Z87.438 PERSONAL HISTORY OF OTHER DISEASES OF MALE GENITAL ORGANS: ICD-10-CM

## 2022-06-27 PROCEDURE — 99213 OFFICE O/P EST LOW 20 MIN: CPT

## 2022-06-29 PROBLEM — Z87.438 HISTORY OF BPH: Status: RESOLVED | Noted: 2022-06-29 | Resolved: 2022-06-29

## 2022-06-29 PROBLEM — Z86.79 HISTORY OF HYPERTENSION: Status: RESOLVED | Noted: 2022-06-29 | Resolved: 2022-06-29

## 2022-06-29 PROBLEM — Z86.39 HISTORY OF DIABETES MELLITUS: Status: RESOLVED | Noted: 2022-06-29 | Resolved: 2022-06-29

## 2022-06-29 PROBLEM — Z86.79 HISTORY OF PERIPHERAL VASCULAR DISEASE: Status: RESOLVED | Noted: 2022-06-29 | Resolved: 2022-06-29

## 2022-06-29 PROBLEM — Z78.9 DOES NOT USE TOBACCO: Status: ACTIVE | Noted: 2022-06-29

## 2022-06-29 PROBLEM — Z82.49 FAMILY HISTORY OF CORONARY ARTERIOSCLEROSIS: Status: ACTIVE | Noted: 2022-06-29

## 2022-06-29 PROBLEM — Z85.46 HISTORY OF MALIGNANT NEOPLASM OF PROSTATE: Status: RESOLVED | Noted: 2022-06-29 | Resolved: 2022-06-29

## 2022-06-29 PROBLEM — Z86.39 HISTORY OF HYPERLIPIDEMIA: Status: RESOLVED | Noted: 2022-06-29 | Resolved: 2022-06-29

## 2022-06-29 RX ORDER — LISINOPRIL 40 MG/1
40 TABLET ORAL
Refills: 0 | Status: ACTIVE | COMMUNITY

## 2022-06-29 RX ORDER — ATORVASTATIN CALCIUM 20 MG/1
20 TABLET, FILM COATED ORAL
Refills: 0 | Status: ACTIVE | COMMUNITY

## 2022-06-29 RX ORDER — HYDROCHLOROTHIAZIDE 12.5 MG/1
12.5 CAPSULE ORAL
Refills: 0 | Status: ACTIVE | COMMUNITY

## 2022-06-29 RX ORDER — INSULIN DETEMIR 100 [IU]/ML
100 INJECTION, SOLUTION SUBCUTANEOUS
Refills: 0 | Status: ACTIVE | COMMUNITY

## 2022-06-29 RX ORDER — GLIPIZIDE 10 MG/1
10 TABLET ORAL
Refills: 0 | Status: ACTIVE | COMMUNITY

## 2022-06-29 RX ORDER — METOPROLOL SUCCINATE 50 MG/1
50 TABLET, EXTENDED RELEASE ORAL
Refills: 0 | Status: ACTIVE | COMMUNITY

## 2022-06-29 RX ORDER — METFORMIN HYDROCHLORIDE 1000 MG/1
1000 TABLET, COATED ORAL
Refills: 0 | Status: ACTIVE | COMMUNITY

## 2022-06-29 RX ORDER — METHIMAZOLE 10 MG/1
10 TABLET ORAL
Refills: 0 | Status: ACTIVE | COMMUNITY

## 2022-06-29 RX ORDER — AMLODIPINE BESYLATE 10 MG/1
10 TABLET ORAL
Refills: 0 | Status: ACTIVE | COMMUNITY

## 2022-07-01 NOTE — HISTORY OF PRESENT ILLNESS
[FreeTextEntry1] : 68 y/o Jordanian speaking Male with a FHx of CAD (sister, CABG (40s) and a PMHx moderate aortic stenosis, prostate cancer s/p radical prostatectomy, DM, HTN, HLD, PVD, BPH presented to Dr. Michelle c/o FERRER on walking 2-3 blocks with retrosternal tightness and easy fatigue with mild exertion, occasional palpitations and dizziness, referred to [facility] for cardiac catheterization, and now presents to D, Dr. Hoang, for follow up after consultation at [facility]. \par \par ECHO 5/16/22 revealed EF 60%, moderate AS with PG 29mmHg.\par Cardiac cath 6/16/22 revealed nonobstructive CAD.\par EKG 6/16/22: NSR\par \par Patient feels well currently, does admit to FERRER with 6-7 blocks. Denies chest pain, palpitations, SOB, dizziness or LE edema. \par

## 2022-07-31 ENCOUNTER — FORM ENCOUNTER (OUTPATIENT)
Age: 67
End: 2022-07-31

## 2022-08-01 ENCOUNTER — APPOINTMENT (OUTPATIENT)
Dept: CARDIOTHORACIC SURGERY | Facility: CLINIC | Age: 67
End: 2022-08-01

## 2022-08-01 ENCOUNTER — OUTPATIENT (OUTPATIENT)
Dept: OUTPATIENT SERVICES | Facility: HOSPITAL | Age: 67
LOS: 1 days | End: 2022-08-01
Payer: MEDICAID

## 2022-08-01 VITALS
HEART RATE: 70 BPM | BODY MASS INDEX: 23.39 KG/M2 | TEMPERATURE: 97.4 F | OXYGEN SATURATION: 99 % | WEIGHT: 149 LBS | DIASTOLIC BLOOD PRESSURE: 89 MMHG | SYSTOLIC BLOOD PRESSURE: 181 MMHG | RESPIRATION RATE: 18 BRPM | HEIGHT: 67 IN

## 2022-08-01 DIAGNOSIS — Z90.79 ACQUIRED ABSENCE OF OTHER GENITAL ORGAN(S): Chronic | ICD-10-CM

## 2022-08-01 DIAGNOSIS — I35.9 NONRHEUMATIC AORTIC VALVE DISORDER, UNSPECIFIED: ICD-10-CM

## 2022-08-01 DIAGNOSIS — I35.0 NONRHEUMATIC AORTIC (VALVE) STENOSIS: ICD-10-CM

## 2022-08-01 PROCEDURE — 93306 TTE W/DOPPLER COMPLETE: CPT

## 2022-08-01 PROCEDURE — 93306 TTE W/DOPPLER COMPLETE: CPT | Mod: 26

## 2022-08-01 PROCEDURE — 99213 OFFICE O/P EST LOW 20 MIN: CPT

## 2022-08-03 NOTE — HISTORY OF PRESENT ILLNESS
[FreeTextEntry1] : 66 year old Indonesian speaking Male with a FHx of CAD (sister, CABG (40s) and a PMHx moderate aortic stenosis, prostate cancer s/p radical prostatectomy, DM, HTN, HLD, PVD, BPH who presents for follow up\par \par ECHO 5/16/22 revealed EF 60%, moderate AS with PG 29mmHg.\par Cardiac cath 6/16/22 revealed nonobstructive CAD.\par EKG 6/16/22: NSR\par \par Since his last visit, the patient states he continues to feel well.  He denies chest pain, shortness of breath at rest or with exertion, dizziness, syncope, orthopnea, PND, or LE edema.  \par

## 2023-01-19 NOTE — HISTORY OF PRESENT ILLNESS
[FreeTextEntry1] : 67 year old Malaysian speaking Male with a FHx of CAD (sister, CABG (40s) and a PMHx moderate aortic stenosis, prostate cancer s/p radical prostatectomy, DM, HTN, HLD, PVD, BPH who presents for follow up with a repeat ECHO. \par \par ECHO 5/16/22 revealed EF 60%, moderate AS with PG 29mmHg.\par Cardiac cath 6/16/22 revealed nonobstructive CAD.\par EKG 6/16/22: NSR\par \par Since his last visit, the patient states \par

## 2023-01-23 ENCOUNTER — OUTPATIENT (OUTPATIENT)
Dept: OUTPATIENT SERVICES | Facility: HOSPITAL | Age: 68
LOS: 1 days | End: 2023-01-23

## 2023-01-23 ENCOUNTER — NON-APPOINTMENT (OUTPATIENT)
Age: 68
End: 2023-01-23

## 2023-01-23 ENCOUNTER — APPOINTMENT (OUTPATIENT)
Dept: CARDIOTHORACIC SURGERY | Facility: CLINIC | Age: 68
End: 2023-01-23

## 2023-01-23 DIAGNOSIS — Z90.79 ACQUIRED ABSENCE OF OTHER GENITAL ORGAN(S): Chronic | ICD-10-CM

## 2023-01-23 DIAGNOSIS — I35.0 NONRHEUMATIC AORTIC (VALVE) STENOSIS: ICD-10-CM
